# Patient Record
Sex: MALE | Race: ASIAN | Employment: PART TIME | URBAN - METROPOLITAN AREA
[De-identification: names, ages, dates, MRNs, and addresses within clinical notes are randomized per-mention and may not be internally consistent; named-entity substitution may affect disease eponyms.]

---

## 2019-01-13 ENCOUNTER — APPOINTMENT (OUTPATIENT)
Dept: GENERAL RADIOLOGY | Age: 19
End: 2019-01-13
Payer: MEDICARE

## 2019-01-13 ENCOUNTER — HOSPITAL ENCOUNTER (EMERGENCY)
Age: 19
Discharge: HOME | End: 2019-01-13
Payer: MEDICARE

## 2019-01-13 VITALS
SYSTOLIC BLOOD PRESSURE: 122 MMHG | OXYGEN SATURATION: 99 % | DIASTOLIC BLOOD PRESSURE: 79 MMHG | RESPIRATION RATE: 18 BRPM | BODY MASS INDEX: 23.55 KG/M2 | WEIGHT: 128.75 LBS | HEART RATE: 96 BPM | TEMPERATURE: 98.2 F

## 2019-01-13 DIAGNOSIS — S63.501A SPRAIN OF RIGHT WRIST, INITIAL ENCOUNTER: Primary | ICD-10-CM

## 2019-01-13 PROCEDURE — 99283 EMERGENCY DEPT VISIT LOW MDM: CPT

## 2019-01-13 PROCEDURE — 73130 X-RAY EXAM OF HAND: CPT

## 2019-01-13 PROCEDURE — 4500000023 HC ED LEVEL 3 PROCEDURE

## 2019-01-13 PROCEDURE — 73110 X-RAY EXAM OF WRIST: CPT

## 2019-01-13 RX ORDER — NAPROXEN 250 MG/1
500 TABLET ORAL ONCE
Status: DISCONTINUED | OUTPATIENT
Start: 2019-01-13 | End: 2019-01-13 | Stop reason: HOSPADM

## 2019-01-13 ASSESSMENT — ENCOUNTER SYMPTOMS
BACK PAIN: 0
NAUSEA: 0

## 2019-01-13 ASSESSMENT — PAIN SCALES - GENERAL
PAINLEVEL_OUTOF10: 10
PAINLEVEL_OUTOF10: 5

## 2019-02-19 ENCOUNTER — APPOINTMENT (OUTPATIENT)
Dept: GENERAL RADIOLOGY | Age: 19
End: 2019-02-19
Payer: MEDICARE

## 2019-02-19 ENCOUNTER — HOSPITAL ENCOUNTER (EMERGENCY)
Age: 19
Discharge: HOME | End: 2019-02-19
Payer: MEDICARE

## 2019-02-19 VITALS
WEIGHT: 131.84 LBS | TEMPERATURE: 97.6 F | RESPIRATION RATE: 12 BRPM | HEART RATE: 98 BPM | SYSTOLIC BLOOD PRESSURE: 124 MMHG | BODY MASS INDEX: 24.11 KG/M2 | DIASTOLIC BLOOD PRESSURE: 80 MMHG | OXYGEN SATURATION: 99 %

## 2019-02-19 DIAGNOSIS — S69.91XA INJURY OF RIGHT HAND, INITIAL ENCOUNTER: Primary | ICD-10-CM

## 2019-02-19 PROCEDURE — 99283 EMERGENCY DEPT VISIT LOW MDM: CPT

## 2019-02-19 PROCEDURE — 73130 X-RAY EXAM OF HAND: CPT

## 2019-02-19 ASSESSMENT — PAIN SCALES - GENERAL: PAINLEVEL_OUTOF10: 10

## 2019-02-20 ASSESSMENT — ENCOUNTER SYMPTOMS: COLOR CHANGE: 1

## 2020-01-03 ENCOUNTER — HOSPITAL ENCOUNTER (EMERGENCY)
Age: 20
Discharge: HOME | End: 2020-01-03
Payer: MEDICARE

## 2020-01-03 VITALS
SYSTOLIC BLOOD PRESSURE: 117 MMHG | OXYGEN SATURATION: 100 % | TEMPERATURE: 97.1 F | RESPIRATION RATE: 16 BRPM | HEART RATE: 77 BPM | DIASTOLIC BLOOD PRESSURE: 80 MMHG

## 2020-01-03 PROCEDURE — 99282 EMERGENCY DEPT VISIT SF MDM: CPT

## 2020-01-03 ASSESSMENT — ENCOUNTER SYMPTOMS: RESPIRATORY NEGATIVE: 1

## 2020-01-03 ASSESSMENT — PAIN SCALES - GENERAL
PAINLEVEL_OUTOF10: 0
PAINLEVEL_OUTOF10: 0

## 2020-01-03 NOTE — ED PROVIDER NOTES
[facility]  eMERGENCY dEPARTMENT eNCOUnter    Pt Name: @@  MRN: [id number]  Birthdate2000  Date of evaluation: 1/3/2020  Provider: IRVIN Ledesma CNP     Evaluated by the advanced practice provider    [address] [place] [address]       Chief Complaint   Patient presents with    Laceration     pt \"rolled\" ankle and fell onto right hand. + abraision. denies ankle pain. tetanus UTD. HISTORY OF PRESENT ILLNESS  (Location/Symptom, Timing/Onset, Context/Setting, Quality, Duration, Modifying Factors, Severity.)   Haroon Ambriz is a 22 y.o. male who presents to the emergency department complains of pain on the dorsal surface of the right hand between the third MCP and the fourth MCP and the webspace. States that this evening he tripped in his room, rolled his ankle, ankle was not the issue or concern, and noticed few abrasions on the hand and knuckles. He states that he is concerned that maybe he has a tendon injury around the right third finger. Nursing Notes were reviewed and I agree. REVIEW OF SYSTEMS    (2-9 systems for level 4, 10 or more for level 5)     Review of Systems   Constitutional: Negative. Respiratory: Negative. Cardiovascular: Negative. Musculoskeletal: Positive for arthralgias. Except as noted above the remainder of the review of systems was reviewed and negative. PAST MEDICAL HISTORY         Diagnosis Date    Asthma    Autism spectrum disorder  Rheumatoid arthritis    SURGICAL HISTORY           Procedure Laterality Date    TYMPANOSTOMY TUBE PLACEMENT         CURRENT MEDICATIONS       Current Discharge Medication List      CONTINUE these medications which have NOT CHANGED    Details   albuterol sulfate  (90 Base) MCG/ACT inhaler Inhale 2 puffs into the lungs every 4-6 hours as needed      Golimumab 50 MG/0.5ML SOAJ Inject 50 mg into the skin every 30 days             ALLERGIES     Abilify [aripiprazole];  Flovent hfa [fluticasone]; Invega [paliperidone]; Risperidone and related; Seroquel [quetiapine]; Wellbutrin [bupropion]; and Zyprexa [olanzapine]    FAMILY HISTORY     History reviewed. No pertinent family history. No family status information on file. SOCIAL HISTORY      reports that he has been smoking cigarettes. He has been smoking about 0.25 packs per day. He has never used smokeless tobacco. He reports that he does not drink alcohol or use drugs. PHYSICAL EXAM    (up to 7 for level 4, 8 or more for level 5)      ED Triage Vitals [01/03/20 1730]   BP Temp Temp Source Pulse Resp SpO2 Height Weight   117/80 97.1 °F (36.2 °C) Oral 77 16 100 % -- --       Physical Exam  Vitals signs and nursing note reviewed. HENT:      Head: Normocephalic. Cardiovascular:      Rate and Rhythm: Normal rate. Pulses: Normal pulses. Pulmonary:      Effort: Pulmonary effort is normal.   Musculoskeletal:      Right hand: He exhibits tenderness. He exhibits normal range of motion, no bony tenderness, normal two-point discrimination, normal capillary refill, no deformity, no laceration and no swelling. Normal sensation noted. Decreased sensation is not present in the ulnar distribution, is not present in the medial distribution and is not present in the radial distribution. Normal strength noted. He exhibits no finger abduction, no thumb/finger opposition and no wrist extension trouble. Hands:       Comments: Patient is able to independently and actively flex all 5 digits on the right hand at the MCP PIP joints. He is able to flex and extend actively against resistance at the MCP PIP and DIP joints. He has tenderness directly on the third MCP. There is a small amount of bruising. Palmar surface of the hand is unremarkable. Cap refill less than 2 seconds. No deformity. He has 3 small superficial abrasions on the third MCP fourth MCP and the third PIP joint. Skin:     General: Skin is warm and dry. Capillary Refill: Capillary refill takes less than 2 seconds. Neurological:      Mental Status: He is alert. DIAGNOSTIC RESULTS     RADIOLOGY:   Non-plain film images such as CT, Ultrasound and MRI are read by the radiologist. Plain radiographic images are visualized and preliminarily interpreted by IRVIN Montgomery CNP with the below findings:        Interpretation per the Radiologist below, if available at the time of this note:    No orders to display       LABS:  Labs Reviewed - No data to display    All other labs were within normal range or not returned as of this dictation. EMERGENCY DEPARTMENT COURSE and DIFFERENTIAL DIAGNOSIS/MDM:   Vitals:    Vitals:    01/03/20 1730   BP: 117/80   Pulse: 77   Resp: 16   Temp: 97.1 °F (36.2 °C)   TempSrc: Oral   SpO2: 100%       MDM    Patient was seen and evaluated per myself. Dr. Carlos Luque present available for consultation as needed. On clinical exam I only appreciate some mild tenderness and may be some early developing bruising over the third MCP and fourth MCP. Neurologically he is intact. There is no lacerations only superficial abrasions. He performed all musculoskeletal range of motion appropriately and as would expect. I do not feel that there is any bony injury and I do not feel that there is any soft tissue injury such as ligament or tendon. I see no indication that would warrant an x-ray. This was discussed with the patient as well as the patient's mother. Both verbalized understanding. They would appreciate an orthopedic referral because the patient has had a ruptured thumb tendon in the past and was able to perform appropriate range of motion. Based on this information with his history of rheumatoid arthritis I again offered to perform obtain an x-ray and both declined. I also offered to applied a static aluminum splint and both declined that as well.     Patient is discharged with an orthopedic referral and instructed on management of contusions and wound care. This dictation was performed with a verbal recognition program (DRAGON) and it was checked for errors. It is possible that there are still dictated errors within this office note. If so, please bring any errors to my attention for an addendum. All efforts were made to ensure that this office note is accurate. PROCEDURES:  Procedures    FINAL IMPRESSION      1. Contusion of finger without damage to nail, unspecified finger, initial encounter    2.  Contusion of right hand, initial encounter          DISPOSITION/PLAN   DISPOSITION Decision To Discharge 01/03/2020 06:16:26 PM      PATIENT REFERRED TO:  Akshat Barron MD  [place] [address]  [phone]    Schedule an appointment as soon as possible for a visit   As needed    Cristina Cardoza MD  [address] [place] [address]  [phone]    Schedule an appointment as soon as possible for a visit   Orthopedic referral for concerns of hand injury      DISCHARGE MEDICATIONS:  Current Discharge Medication List          (Please note that portions of this note werecompleted with a voice recognition program.  Efforts were made to edit the dictations but occasionally words are mis-transcribed.)    Cindy Sommer, APRTAMIA - CNP      IRVIN Alston - LYNETTE  01/03/20 2303

## 2020-01-08 ENCOUNTER — APPOINTMENT (OUTPATIENT)
Dept: GENERAL RADIOLOGY | Age: 20
End: 2020-01-08
Payer: MEDICARE

## 2020-01-08 ENCOUNTER — HOSPITAL ENCOUNTER (EMERGENCY)
Age: 20
Discharge: HOME | End: 2020-01-09
Payer: MEDICARE

## 2020-01-08 PROCEDURE — 73130 X-RAY EXAM OF HAND: CPT

## 2020-01-08 PROCEDURE — 99283 EMERGENCY DEPT VISIT LOW MDM: CPT

## 2020-01-08 ASSESSMENT — PAIN SCALES - GENERAL: PAINLEVEL_OUTOF10: 10

## 2020-01-08 ASSESSMENT — PAIN DESCRIPTION - PAIN TYPE: TYPE: ACUTE PAIN

## 2020-01-08 ASSESSMENT — PAIN DESCRIPTION - LOCATION: LOCATION: HAND

## 2020-01-08 ASSESSMENT — PAIN DESCRIPTION - DESCRIPTORS: DESCRIPTORS: DISCOMFORT

## 2020-01-09 VITALS
BODY MASS INDEX: 23.79 KG/M2 | SYSTOLIC BLOOD PRESSURE: 120 MMHG | WEIGHT: 130.07 LBS | RESPIRATION RATE: 16 BRPM | HEART RATE: 80 BPM | TEMPERATURE: 97.3 F | DIASTOLIC BLOOD PRESSURE: 80 MMHG | OXYGEN SATURATION: 100 %

## 2020-01-09 RX ORDER — AMOXICILLIN 500 MG/1
500 CAPSULE ORAL 2 TIMES DAILY
Qty: 20 CAPSULE | Refills: 0 | Status: SHIPPED | OUTPATIENT
Start: 2020-01-09 | End: 2020-01-19

## 2020-01-09 RX ORDER — ERGOCALCIFEROL 1.25 MG/1
50000 CAPSULE ORAL WEEKLY
COMMUNITY
End: 2021-01-05 | Stop reason: SDUPTHER

## 2020-01-09 ASSESSMENT — ENCOUNTER SYMPTOMS
FACIAL SWELLING: 0
SORE THROAT: 0
SINUS PAIN: 0
COLOR CHANGE: 0

## 2020-01-09 ASSESSMENT — PAIN SCALES - GENERAL: PAINLEVEL_OUTOF10: 10

## 2020-01-09 ASSESSMENT — PAIN - FUNCTIONAL ASSESSMENT: PAIN_FUNCTIONAL_ASSESSMENT: 0-10

## 2020-01-09 NOTE — ED PROVIDER NOTES
**EVALUATED BY ADVANCED PRACTICE PROVIDER**        [address] [facility]      Pt Name: Michael Sotomayor  LUF:[id number]  [place] 2000  Date of evaluation: 1/8/2020  Provider: IRVIN Clayton CNP      Chief Complaint:    Chief Complaint   Patient presents with    Hand Injury     bilateal hands. punched something today    Otalgia     right ear pain. Nursing Notes, Past Medical Hx, Past Surgical Hx, Social Hx, Allergies, and Family Hx were all reviewed and agreed with or any disagreements were addressed in the HPI.    HPI:  (Location, Duration, Timing, Severity, Quality, Assoc Sx, Context, Modifying factors)  This is a  22 y.o. male who presents to the emergency department today complaining of bilateral hand pain. Onset was yesterday. The context was he got angry and was punching a metal item. He has several abrasions with no lacerations. No numbness or tingling. He is also complaining of right ear pain. Patient had surgery on his right ear at [facility] 3 to 4 weeks ago. No fevers. No headache or dizziness. No sinus pressure. No neck pain or stiffness.     PastMedical/Surgical History:      Diagnosis Date    Arthritis     Asthma     Vitamin D deficiency          Procedure Laterality Date    ADENOIDECTOMY      CIRCUMCISION      TYMPANOSTOMY TUBE PLACEMENT         Medications:  Discharge Medication List as of 1/9/2020  1:59 AM      CONTINUE these medications which have NOT CHANGED    Details   vitamin D (ERGOCALCIFEROL) 1.25 MG (46585 UT) CAPS capsule Take 50,000 Units by mouth once a weekHistorical Med      albuterol sulfate  (90 Base) MCG/ACT inhaler Inhale 2 puffs into the lungs every 4-6 hours as neededHistorical Med      Golimumab 50 MG/0.5ML SOAJ Inject 50 mg into the skin every 30 daysHistorical Med               Review of Systems:  Review of Systems   Constitutional: Negative for chills, diaphoresis and fever. HENT: Positive for ear pain (right). Negative for congestion, facial swelling, sinus pain and sore throat. Genitourinary: Negative for dysuria. Musculoskeletal: Positive for arthralgias and joint swelling (hands). Negative for neck pain and neck stiffness. Skin: Positive for wound. Negative for color change. Allergic/Immunologic: Negative for immunocompromised state. Neurological: Negative for dizziness and headaches. Hematological: Negative for adenopathy. Psychiatric/Behavioral: Negative for confusion. All other systems reviewed and are negative. Positives and Pertinent negatives as per HPI. Except as noted above in the ROS, problem specific ROS was completed and is negative. Physical Exam:  Physical Exam  Vitals signs and nursing note reviewed. Constitutional:       General: He is not in acute distress. Appearance: Normal appearance. He is well-developed. He is not toxic-appearing. HENT:      Head: Normocephalic and atraumatic. Right Ear: Tenderness present. Tympanic membrane is erythematous. Left Ear: Tympanic membrane, ear canal and external ear normal.      Mouth/Throat:      Lips: Pink. Mouth: Mucous membranes are moist.      Pharynx: Oropharynx is clear. Eyes:      General: No scleral icterus. Conjunctiva/sclera: Conjunctivae normal.   Neck:      Musculoskeletal: Full passive range of motion without pain and neck supple. No neck rigidity. Vascular: No JVD. Cardiovascular:      Rate and Rhythm: Normal rate and regular rhythm. Heart sounds: No murmur. No friction rub. No gallop. Pulmonary:      Effort: Pulmonary effort is normal. No respiratory distress. Breath sounds: Normal breath sounds. Abdominal:      Palpations: Abdomen is not rigid. Musculoskeletal: Normal range of motion. Right hand: He exhibits tenderness and swelling.  He exhibits normal range of motion, no bony tenderness, normal capillary refill, no deformity and no laceration. Normal sensation noted. Normal strength noted. Left hand: He exhibits tenderness, decreased capillary refill and swelling. He exhibits normal range of motion, no bony tenderness, no deformity and no laceration. Normal sensation noted. Normal strength noted. Hands:    Skin:     General: Skin is warm and dry. Capillary Refill: Capillary refill takes less than 2 seconds. Findings: Abrasion and bruising present. Neurological:      General: No focal deficit present. Mental Status: He is alert and oriented to person, place, and time. Cranial Nerves: Cranial nerves are intact. Psychiatric:         Mood and Affect: Mood normal.         MEDICAL DECISION MAKING    Vitals:    Vitals:    01/08/20 2201 01/09/20 0206   BP: 120/82 120/80   Pulse: 87 80   Resp: 16 16   Temp: 97.3 °F (36.3 °C) 97.3 °F (36.3 °C)   TempSrc: Oral Oral   SpO2: 100% 100%   Weight: 130 lb 1.1 oz (59 kg)        LABS:Labs Reviewed - No data to display     Remainder of labs reviewed and werenegative at this time or not returned at the time of this note. RADIOLOGY:   Non-plain film images such as CT, Ultrasound and MRI are read by the radiologist. IRVIN Kruse CNP have directly visualized the radiologic plain film image(s) with the below findings:        Interpretation per the Radiologist below, if available at the time of this note:    XR HAND RIGHT (MIN 3 VIEWS)   Final Result   No acute osseous abnormality. XR HAND LEFT (MIN 3 VIEWS)   Final Result   No acute osseous abnormality. Xr Hand Left (min 3 Views)    Result Date: 1/8/2020  EXAMINATION: THREE XRAY VIEWS OF THE LEFT HAND 1/8/2020 10:16 pm COMPARISON: None. HISTORY: ORDERING SYSTEM PROVIDED HISTORY: pt reports it \"hurts everywhere. abrasion to left 2nd finger knuckle and 3rd knuckle. TECHNOLOGIST PROVIDED HISTORY: Reason for exam:->pt reports it \"hurts everywhere.  abrasion to left 2nd finger

## 2020-01-09 NOTE — ED NOTES
Discharge and education instructions reviewed. Patient verbalized understanding, teach-back successful. Patient denied questions at this time. No acute distress noted. Patient instructed to follow-up as noted - return to emergency department if symptoms worsen. Patient verbalized understanding. Discharged per EDMD with discharge instructions.          Kristopher Farfan RN  01/09/20 6380

## 2020-01-20 ENCOUNTER — OFFICE VISIT (OUTPATIENT)
Dept: FAMILY MEDICINE CLINIC | Age: 20
End: 2020-01-20
Payer: MEDICARE

## 2020-01-20 VITALS
BODY MASS INDEX: 24.48 KG/M2 | RESPIRATION RATE: 12 BRPM | HEIGHT: 62 IN | OXYGEN SATURATION: 98 % | SYSTOLIC BLOOD PRESSURE: 100 MMHG | DIASTOLIC BLOOD PRESSURE: 63 MMHG | HEART RATE: 82 BPM | WEIGHT: 133 LBS

## 2020-01-20 PROBLEM — Z90.49 HISTORY OF APPENDECTOMY: Status: ACTIVE | Noted: 2020-01-20

## 2020-01-20 PROBLEM — Z90.49 HISTORY OF APPENDECTOMY: Status: RESOLVED | Noted: 2020-01-20 | Resolved: 2020-01-20

## 2020-01-20 PROBLEM — Z98.890 HISTORY OF TYMPANOPLASTY: Status: ACTIVE | Noted: 2020-01-20

## 2020-01-20 PROBLEM — F99 MENTAL HEALTH DISORDER: Status: ACTIVE | Noted: 2020-01-20

## 2020-01-20 LAB — VITAMIN D 25-HYDROXY: 33.3 NG/ML

## 2020-01-20 PROCEDURE — 36415 COLL VENOUS BLD VENIPUNCTURE: CPT | Performed by: INTERNAL MEDICINE

## 2020-01-20 PROCEDURE — G8420 CALC BMI NORM PARAMETERS: HCPCS | Performed by: INTERNAL MEDICINE

## 2020-01-20 PROCEDURE — G8484 FLU IMMUNIZE NO ADMIN: HCPCS | Performed by: INTERNAL MEDICINE

## 2020-01-20 PROCEDURE — 99204 OFFICE O/P NEW MOD 45 MIN: CPT | Performed by: INTERNAL MEDICINE

## 2020-01-20 PROCEDURE — G8427 DOCREV CUR MEDS BY ELIG CLIN: HCPCS | Performed by: INTERNAL MEDICINE

## 2020-01-20 PROCEDURE — 4004F PT TOBACCO SCREEN RCVD TLK: CPT | Performed by: INTERNAL MEDICINE

## 2020-01-20 RX ORDER — ALBUTEROL SULFATE 90 UG/1
2 AEROSOL, METERED RESPIRATORY (INHALATION) 4 TIMES DAILY PRN
Qty: 1 INHALER | Refills: 1 | Status: SHIPPED | OUTPATIENT
Start: 2020-01-20 | End: 2020-03-24 | Stop reason: SDUPTHER

## 2020-01-20 NOTE — PROGRESS NOTES
HPI: Haroon Ambriz presents to establish care and get established with a psychologist.    Health issues include ankylosing spondylitis, Humira use, low vitamin D, renal calculi, tobacco addiction, asthma, mental health issues with multiple drug sensitivities, restrictive lung disease secondary to ankylosing spondylitis, occasional ectopy. States over the last couple days he has had some intermittent skipping of the heart. Echocardiogram 2018 with mildly thickened mitral valve. Good ejection fraction without scar. 48-hour Holter reportedly unremarkable. 1 soda a day. Positive tobacco.  Denies current stressors. No syncope. No recreational drugs. No alcohol currently. No syncope. No chest pain with exertion. Has had symptoms similar in the past which resolved without medication. Ankylosing spondylitis without extra-articular symptoms. On Humira. Aware that this is a immunosuppressive medication. Stiffness of the neck. Occasional falls. No iritis, inflammatory bowel symptoms, has had restrictive pulmonary function test.  Uses albuterol for apparent wheeze. Positive HLA-B27. Doing better with his Humira. History low vitamin D.  50,000 units monthly. Diagnosed with asthma. Has inhaler. Reaction to [place]. No symptoms currently. Has had pneumonia vaccine. Interested in patch for smoking cessation. Had hallucinations with Wellbutrin. Not interested in Chantix secondary to some mental health issues    3 renal calculi no surgical intervention needed. Mental health issues. Worn in [place]. I see he has intolerance to Abilify, risperidone, Seroquel, Wellbutrin, Zyprexa, and in [place]. He and mother are wanting to get into counseling. They are not interested in psychiatrist at this time. Is aware of the mental health crisis unit. Wishes him to have therapy for expressive and cognitive training.   While hospitalizations for suicide attempt and ideation, aggression, auditory and visual hallucinations, diagnosed with posttraumatic stress disorder, moderate intellectual disability, borderline personality disorder and ADHD. He has been associated with [facility] developmental disability services and [facility]    Specialist: Rheumatology,  Audiology ENT hearing aids   Interested in counselor. PMH:    Tympanoplasty     Circumcision     Adenoidectomy     Hospitalizations for suicidal ideation, attempts, aggressive behavior and hallucination    SH: lives with step sister, step dad and mom. + tobacco 4 to 5 cigarettes daily. . No alcohol. Not currently sexually active condoms. [employer]. FH: adopted     Microcephaly, mental illness       Of systems: Mental illness, concussion, hearing loss, restrictive lung disease, intermittent palpitations, no GE reflux bloody stools kidney infections. Positive stones, no skin concerns.   Occasional palpitations    Constitutional, ent, CV, respiratory, GI, , joint, skin, allergic and psychiatric ROS reviewed and negative except for above    Allergies   Allergen Reactions    Latex     Abilify [Aripiprazole]     Flovent Hfa [Fluticasone]     Invega [Paliperidone]     Risperidone And Related     Seroquel [Quetiapine]     Wellbutrin [Bupropion]     Zyprexa [Olanzapine]        Outpatient Medications Marked as Taking for the 1/20/20 encounter (Office Visit) with Kellie Davenport MD   Medication Sig Dispense Refill    albuterol sulfate  (90 Base) MCG/ACT inhaler Inhale 2 puffs into the lungs 4 times daily as needed for Wheezing 1 Inhaler 1    vitamin D (ERGOCALCIFEROL) 1.25 MG (21900 UT) CAPS capsule Take 50,000 Units by mouth once a week      Golimumab 50 MG/0.5ML SOAJ Inject 50 mg into the skin every 30 days               Past Medical History:   Diagnosis Date    Arthritis     Asthma     Mental health disorder 1/20/2020    Vitamin D deficiency        Past Surgical History:   Procedure Laterality Date    ADENOIDECTOMY      CIRCUMCISION      TYMPANOSTOMY TUBE PLACEMENT      colesteoma             No family history on file. Review of Systems        Objective     /63   Pulse 82   Resp 12   Ht 5' 2\" (1.575 m)   Wt 133 lb (60.3 kg)   SpO2 98% Comment: RA  BMI 24.33 kg/m²     @LASTSAO2(3)@    Wt Readings from Last 3 Encounters:   01/08/20 130 lb 1.1 oz (59 kg)   01/08/20 130 lb (59 kg)   02/19/19 131 lb 13.4 oz (59.8 kg)       Physical Exam     NAD alert and cooperative multiple tattoos. Good eye contact unusual affect  HEENT: Abnormal tympanic membranes. Decreased hearing right. Cranial nerves otherwise intact. Throat is clear. Good dentition. No adenopathy or thyromegaly. Lungs are clear. Good TIGRE ratio without any wheezes rales or rhonchi. Cardiovascular exam I do not any murmur click. No gallop or S4. Abdomen is benign without any hepatosplenomegaly. Crepitus of the knees. Good pulses lower extremities. Multiple tattoos. Walks well. Chemistry        Component Value Date/Time     08/11/2011 0815    K 4.5 08/11/2011 0815     08/11/2011 0815    CO2 32 (H) 08/11/2011 0815    BUN 17 08/11/2011 0815    CREATININE 0.8 08/11/2011 0815        Component Value Date/Time    CALCIUM 10.5 08/11/2011 0815    ALKPHOS 175 08/11/2011 0815    AST 24 08/11/2011 0815    ALT 27 08/11/2011 0815    BILITOT 0.50 08/11/2011 0815            Lab Results   Component Value Date    WBC 6.2 08/11/2011    HGB 14.7 08/11/2011    HCT 42.7 08/11/2011    MCV 88.8 08/11/2011     08/11/2011     No results found for: LABA1C  No results found for: EAG  No results found for: LABA1C  No components found for: CHLPL  No results found for: TRIG  No results found for: HDL  No results found for: LDLCALC  No results found for: LABVLDL    Old labs and records reviewed or requested  Discussed past lab and studies with patient      Diagnosis Orders   1.  Ankylosing spondylitis, unspecified site of spine ([facility] [address] Vitamin D 25 Hydroxy   2. Hypovitaminosis D  Vitamin D 25 Hydroxy   3. Tobacco use     4. History of tympanoplasty     5. History of appendectomy         Loosening spondylitis on Humira immunosuppressed and low vitamin D focally with rheumatology. Will check his vitamin D today. Tobacco abuse. Discussed cessation. Intolerant of Wellbutrin. Will try for 7 mg patch. Hearing loss with tympanoplasty. Following with ENT. Intermittent palpitations with mitral valve thickening. No syncope. Reported normal Holter monitor. Will cut back on his caffeine. Mental health disorder    Follow-up 3 weeks on nicotine patch      Diagnosis and treatment discussed.   Possible side effects of medication reviewed  Patients questions answered  Follow up understood  Pt aware if they are not contacted about any test results , this does not mean they are normal.  They should call

## 2020-01-20 NOTE — PATIENT INSTRUCTIONS
sexually transmitted infections (STIs). Ask whether you should have tests for STIs. You may be at risk if you have sex with more than one person, especially if your partners do not wear condoms. For men  · Tests for sexually transmitted infections (STIs). Ask whether you should have tests for STIs. You may be at risk if you have sex with more than one person, especially if you do not wear a condom. · Testicular cancer exam. Ask your doctor whether you should check your testicles regularly. · Prostate exam. Talk to your doctor about whether you should have a blood test (called a PSA test) for prostate cancer. Experts differ on whether and when men should have this test. Some experts suggest it if you are older than 39 and are -American or have a father or brother who got prostate cancer when he was younger than 72. When should you call for help? Watch closely for changes in your health, and be sure to contact your doctor if you have any problems or symptoms that concern you. Where can you learn more? Go to https://[employer] TelegraphpeModacruz.healthVersafe. org and sign in to your [employer] account. Enter P072 in the [employer] box to learn more about \"Well Visit, Ages 25 to 48: Care Instructions. \"     If you do not have an account, please click on the \"Sign Up Now\" link. Current as of: August 21, 2019  Content Version: 12.3  © 6666-4398 Healthwise, Incorporated. Care instructions adapted under license by [facility] ([facility]). If you have questions about a medical condition or this instruction, always ask your healthcare professional. [place] [address] any warranty or liability for your use of this information.

## 2020-02-18 ENCOUNTER — PATIENT MESSAGE (OUTPATIENT)
Dept: FAMILY MEDICINE CLINIC | Age: 20
End: 2020-02-18

## 2020-02-26 ENCOUNTER — OFFICE VISIT (OUTPATIENT)
Dept: PSYCHOLOGY | Age: 20
End: 2020-02-26
Payer: MEDICARE

## 2020-02-26 PROCEDURE — 90791 PSYCH DIAGNOSTIC EVALUATION: CPT | Performed by: PSYCHOLOGIST

## 2020-02-26 ASSESSMENT — PATIENT HEALTH QUESTIONNAIRE - PHQ9
2. FEELING DOWN, DEPRESSED OR HOPELESS: 0
1. LITTLE INTEREST OR PLEASURE IN DOING THINGS: 0
SUM OF ALL RESPONSES TO PHQ QUESTIONS 1-9: 0
SUM OF ALL RESPONSES TO PHQ9 QUESTIONS 1 & 2: 0
SUM OF ALL RESPONSES TO PHQ QUESTIONS 1-9: 0

## 2020-02-26 ASSESSMENT — ANXIETY QUESTIONNAIRES
6. BECOMING EASILY ANNOYED OR IRRITABLE: 3-NEARLY EVERY DAY
4. TROUBLE RELAXING: 1-SEVERAL DAYS
2. NOT BEING ABLE TO STOP OR CONTROL WORRYING: 1-SEVERAL DAYS
1. FEELING NERVOUS, ANXIOUS, OR ON EDGE: 1-SEVERAL DAYS
3. WORRYING TOO MUCH ABOUT DIFFERENT THINGS: 1-SEVERAL DAYS
5. BEING SO RESTLESS THAT IT IS HARD TO SIT STILL: 1-SEVERAL DAYS
GAD7 TOTAL SCORE: 11
7. FEELING AFRAID AS IF SOMETHING AWFUL MIGHT HAPPEN: 3-NEARLY EVERY DAY

## 2020-02-26 NOTE — PROGRESS NOTES
Behavioral Health Consultation  Vale Sultana PsyD  Psychologist  2/26/2020  11:37 AM      Time spent with Patient: 30 minutes  This is patient's first  [facility] appointment. Reason for Consult:  PTSD  Referring Provider: Miguel Escobedo MD  [address] [place] [address]    Pt provided informed consent for the behavioral health program. Discussed with patient model of service to include the limits of confidentiality (i.e. abuse reporting, suicide intervention, etc.) and short-term intervention focused approach. Pt indicated understanding. Feedback given to PCP. S:    Presenting Problem (PP): anxiety    Problem hx: Per Dr Noe Swanson on 1/20: \"Mental health issues. Worn in [place]. I see he has intolerance to Abilify, risperidone, Seroquel, Wellbutrin, Zyprexa, and in [place] Jar[place]. He and mother are wanting to get into counseling. They are not interested in psychiatrist at this time. Is aware of the mental health crisis unit. Wishes him to have therapy for expressive and cognitive training. While hospitalizations for suicide attempt and ideation, aggression, auditory and visual hallucinations, diagnosed with posttraumatic stress disorder, moderate intellectual disability, borderline personality disorder and ADHD. He has been associated with [facility] developmental disability services and [facility] behavioral health\"    UPDATE: Met with pt (and mother). No GCB after 4 years CM and counseling. Didn't feel like they were listening.  CC: sleep trouble, falling and staying asleep, feeling tired daily     Current psych med tx: none    Psych ROS:      Depression: see PHQ-9 score below     Sultana: DENIES insomnia with increased energy, rapid speech, easily distracted or decreased attention, irritability, racing thoughts, expansive mood, increase in energy and goal directed behavior, grandiosity, flight of ideas     Anxiety:  see LANCE-7 score below    OCD:  Denies     Panic:  Denies Psychosis: denies A/VH, or delusions    Substance abuse: none     PTSD:  see above    O:  MSE:    Appearance    alert, cooperative  Appetite normal  Sleep disturbance No  Fatigue No  Loss of pleasure No  Impulsive behavior No  Speech    normal rate, normal volume and well articulated  Mood    Stable, managing  Affect    Guarded with full range  Thought Content    intact  Thought Process    linear, goal directed and coherent  Associations    logical connections  Insight    Good  Judgment    Intact  Orientation    oriented to person, place, time, and general circumstances  Memory    recent and remote memory intact  Attention/Concentration    intact  Morbid ideation No  Suicide Assessment    no suicidal ideation    History:    Medications:   Current Outpatient Medications   Medication Sig Dispense Refill    nicotine (NICODERM CQ) 7 MG/24HR Place 1 patch onto the skin every 24 hours 30 patch 0    albuterol sulfate  (90 Base) MCG/ACT inhaler Inhale 2 puffs into the lungs 4 times daily as needed for Wheezing 1 Inhaler 1    vitamin D (ERGOCALCIFEROL) 1.25 MG (06243 UT) CAPS capsule Take 50,000 Units by mouth once a week      Golimumab 50 MG/0.5ML SOAJ Inject 50 mg into the skin every 30 days       No current facility-administered medications for this visit.         Social History:   Social History     Socioeconomic History    Marital status: Single     Spouse name: Not on file    Number of children: Not on file    Years of education: Not on file    Highest education level: Not on file   Occupational History    Not on file   Social Needs    Financial resource strain: Not on file    Food insecurity:     Worry: Not on file     Inability: Not on file    Transportation needs:     Medical: Not on file     Non-medical: Not on file   Tobacco Use    Smoking status: Current Every Day Smoker     Packs/day: 0.25     Types: Cigarettes    Smokeless tobacco: Never Used   Substance and Sexual Activity    Alcohol anxiety, 10-14 = moderate anxiety, 15+ = severe anxiety. Recommend referral to behavioral health for scores 10 or greater. Denied any si/hi risk, intent, or plan. Diagnosis:    PTSD      Diagnosis Date    Arthritis     Asthma     Mental health disorder 1/20/2020    Vitamin D deficiency      Problems with primary support group, Problems related to the social environment and Other psychosocial and environmental problems    Plan:  Pt interventions:    Trained in strategies for increasing balanced thinking, Discussed self-care (sleep, nutrition, rewarding activities, social support, exercise), Discussed benefits of referral for specialty care, Provided education on PTSD symptoms and treatment options for evidence-based treatment (Cognitive Processing Therapy and Prolonged Exposure), Motivational Interviewing to determine importance and readiness for change, Discussed potential barriers to change, Established rapport, Conducted functional assessment and Supportive techniques    Pt Behavioral Change Plan:    1. Call [facility] neuroscience for neuropsychological testing:    Dr Eloina Batres    2. E-mail list of therapist for Dr Maria Guadalupe Pavon for her to review: after I reviewed list recommended group practice [facility]  3.  Return to clinic for Dr Maria Guadalupe Pavon as needed

## 2020-02-26 NOTE — Clinical Note
KAILEEI--pt coming from [facility], going to need long term therapist. Recommended [facility]. I also believe we are dealing with a personality issue here as well which is absolutely going to require the psychotherapy.

## 2020-03-06 PROBLEM — R25.1 TREMOR: Status: ACTIVE | Noted: 2020-03-06

## 2020-03-24 ENCOUNTER — PATIENT MESSAGE (OUTPATIENT)
Dept: FAMILY MEDICINE CLINIC | Age: 20
End: 2020-03-24

## 2020-03-24 ENCOUNTER — TELEPHONE (OUTPATIENT)
Dept: PSYCHOLOGY | Age: 20
End: 2020-03-24

## 2020-03-24 RX ORDER — ALBUTEROL SULFATE 90 UG/1
2 AEROSOL, METERED RESPIRATORY (INHALATION) 4 TIMES DAILY PRN
Qty: 1 INHALER | Refills: 1 | Status: SHIPPED | OUTPATIENT
Start: 2020-03-24 | End: 2020-03-25 | Stop reason: SDUPTHER

## 2020-03-24 NOTE — TELEPHONE ENCOUNTER
JAY--this is update from mother via my chart: \"Hi Crystal, but [facility] doesnt take one of his insurances and we need both of those to pay for it. So, nicely, we wont need to have the appointments on the second and the ninth because Jaswinder Soares has found a counselor by himself, it is the [facility] and it is [place], Michelle Gardner will be his therapist       and he does his first meeting with her at noon on 31 March. It will be on video until society is back to face to face. I dont think they need a referral. Anyway Mika phone number is [phone] and its the [email or web address] if you would like to put it in the records. Thanks so much for getting him started. \"    Has new therapist!

## 2020-03-25 RX ORDER — ALBUTEROL SULFATE 90 UG/1
2 AEROSOL, METERED RESPIRATORY (INHALATION) 4 TIMES DAILY PRN
Qty: 1 INHALER | Refills: 3 | Status: SHIPPED | OUTPATIENT
Start: 2020-03-25 | End: 2020-07-27

## 2020-07-28 ENCOUNTER — VIRTUAL VISIT (OUTPATIENT)
Dept: FAMILY MEDICINE CLINIC | Age: 20
End: 2020-07-28
Payer: MEDICARE

## 2020-07-28 ENCOUNTER — TELEPHONE (OUTPATIENT)
Dept: FAMILY MEDICINE CLINIC | Age: 20
End: 2020-07-28

## 2020-07-28 PROCEDURE — 99213 OFFICE O/P EST LOW 20 MIN: CPT | Performed by: INTERNAL MEDICINE

## 2020-07-28 RX ORDER — NICOTINE 21 MG/24HR
PATCH, TRANSDERMAL 24 HOURS TRANSDERMAL
Qty: 30 PATCH | Refills: 0 | Status: SHIPPED | OUTPATIENT
Start: 2020-07-28 | End: 2020-08-05

## 2020-07-28 NOTE — PROGRESS NOTES
The below patient isbeing evaluated by a Virtual Visit (video visit) encounter to address concerns as mentioned above. A caregiver was present when appropriate. Due to this being a TeleHealth encounter (During IYG-12 public health emergency), evaluation of the following organ systems was limited: Vitals/Constitutional/EENT/Resp/CV/GI//MS/Neuro/Skin/Heme-Lymph-Imm. Pursuant to the emergency declaration under the [address] [place] [address], [address] [place] [address] authority and the [employer] Act, this Virtual Visit was conducted with patient's (and/or legal guardian's) consent, to reduce the patient's risk of exposure to COVID-19 and provide necessary medical care. The patient (and/or legal guardian) has also been advised to contact this office for worsening conditions or problems, and seek emergency medical treatment and/or call 911 if deemed necessary. Patient identification was verified at the start of the visit: Yes    Total time spent on this encounter: Not billed by time    Services were provided through a video synchronous discussion virtually to substitute for in-person clinic visit. Patient and provider were located at their individual homes. --Angeles Romeo MD on 7/28/2020 at 5:22 PM    An electronic signature was used to authenticate this note. HPI: Giovanni Vivas presents for evaluation and management of hand trauma    Health issues include ankylosing spondylitis, Humira use, low vitamin D, renal calculi, tobacco addiction, asthma, mental health issues with multiple drug sensitivities, restrictive lung disease secondary to ankylosing spondylitis, occasional ectopy. History anger issues. States he is doing better. Punched the wall. Onset of pain. Went to urgent clinic. States film was negative. Continues have pain. Feels like it is doing worse. Notes some popping. Unable to make a clenched fist.  Is concerned. Using Advil and causes diarrhea. States ice, Ace, nonsteroidals it made it worse    Remote ectopy. Echocardiogram 2018 with mildly thickened mitral valve. Good ejection fraction without scar. 48-hour Holter reportedly unremarkable. Positive tobacco.. Has had symptoms similar in the past which resolved without medication.     Ankylosing spondylitis without extra-articular symptoms. golimumab  . No iritis, inflammatory bowel symptoms, has had restrictive pulmonary function test.  Uses albuterol for apparent wheeze. Positive HLA-B27. Doing better and wishes to continue.     History low vitamin D.  50,000 units monthly.     Diagnosed with asthma. Has inhaler. Reaction to Azmacort. + pneumonia vaccine. Interested in patch for smoking cessation. Had hallucinations with Wellbutrin. Not interested in Chantix secondary to some mental health issues is interested in trying a patch again     3 renal calculi no surgical intervention needed.     Mental health issues. Born in [place]. I see he has intolerance to Abilify, risperidone, Seroquel, Wellbutrin, Zyprexa, and in [place]. States no longer seeing a counselor. Has a friend he talks to. States he is not getting into as much trouble currently. Taking no medications. aware of the mental health crisis unit. hospitalizations for suicide attempt and ideation, aggression, auditory and visual hallucinations, diagnosed with posttraumatic stress disorder, moderate intellectual disability, borderline personality disorder and ADHD. He has been associated with [facility] developmental disability services and [facility] health     Specialist: Rheumatology,  Audiology ENT hearing aids        PMH:    Tympanoplasty     Circumcision     Adenoidectomy     Hospitalizations for suicidal ideation, attempts, aggressive behavior and hallucination     SH: lives with step sister, step dad and mom. + tobacco 4 to 5 cigarettes daily. . No alcohol.  Not currently sexually active condoms. Akash.      FH: adopted     Microcephaly, mental illness       Of systems: Mental illness, concussion, hearing loss, restrictive lung disease, intermittent palpitations, no GE reflux bloody stools kidney infections. Positive stones, no skin concerns. Occasional palpitations     Constitutional, ent, CV, respiratory, GI, , joint, skin, allergic and psychiatric ROS reviewed and negative except for above    Allergies   Allergen Reactions    Latex     Abilify [Aripiprazole]     Flovent Hfa [Fluticasone]     Invega [Paliperidone]     Risperidone And Related     Seroquel [Quetiapine]     Wellbutrin [Bupropion]     Zyprexa [Olanzapine]        Outpatient Medications Marked as Taking for the 7/28/20 encounter (Virtual Visit) with Daxa Allen MD   Medication Sig Dispense Refill    PROAIR  (90 Base) MCG/ACT inhaler INHALE TWO PUFFS BY MOUTH FOUR TIMES A DAY AS NEEDED FOR WHEEZING 8.5 g 2    Golimumab 50 MG/0.5ML SOAJ Inject 50 mg into the skin every 30 days               Past Medical History:   Diagnosis Date    Arthritis     Asthma     Mental health disorder 1/20/2020    Vitamin D deficiency        Past Surgical History:   Procedure Laterality Date    ADENOIDECTOMY      CIRCUMCISION      TYMPANOSTOMY TUBE PLACEMENT      colesteoma             No family history on file. Review of Systems      Chemistry        Component Value Date/Time     08/11/2011 0815    K 4.5 08/11/2011 0815     08/11/2011 0815    CO2 32 (H) 08/11/2011 0815    BUN 17 08/11/2011 0815    CREATININE 0.8 08/11/2011 0815        Component Value Date/Time    CALCIUM 10.5 08/11/2011 0815    ALKPHOS 175 08/11/2011 0815    AST 24 08/11/2011 0815    ALT 27 08/11/2011 0815    BILITOT 0.50 08/11/2011 0815            NO vitals  as this was a virtual visit during cvod19 pandemic  Mild dysarthria. Moving hand without difficulty. Making eggs.   Shows pain of the metacarpal phalangeal joint is tender. Unable to make a closed fist with the second finger. No erythema. No abrasion. Lab Results   Component Value Date    WBC 6.2 08/11/2011    HGB 14.7 08/11/2011    HCT 42.7 08/11/2011    MCV 88.8 08/11/2011     08/11/2011     No results found for: LABA1C  No results found for: EAG  No results found for: LABA1C  No components found for: CHLPL  No results found for: TRIG  No results found for: HDL  No results found for: LDLCALC  No results found for: LABVLDL    Old labs and records reviewed or requested  Discussed past lab and studies with patient      Diagnosis Orders   1. Injury of right hand, subsequent encounter  Hiwot Kimball MD, Hand Surgery (Hand, Wrist, Upper Extremity), [facility]   2. Tobacco use     3. Ankylosing spondylitis, unspecified site of spine ([facility] [address]       Recommended ice topical.  Wishes to follow-up with hand doctor. Referral given. History of reactive airways disease albuterol still at home. Interested in patch for smoking cessation 14 mg given. Mental health disorder. Feels like he is doing well now. States taking no medication        No follow-ups on file. Diagnosis and treatment discussed.   Possible side effects of medication reviewed  Patients questions answered  Follow up understood  Pt aware if they are not contacted about any test results , this does not mean they are normal.  They should call
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
## 2020-07-28 NOTE — TELEPHONE ENCOUNTER
Pt seen at [facility] urgent care ([place]). He had hand xray done there, still having pain. Did call the facility but they need medical release.     [facility]:    Phone [phone]  Fax number is [phone]

## 2020-08-05 ENCOUNTER — OFFICE VISIT (OUTPATIENT)
Dept: ORTHOPEDIC SURGERY | Age: 20
End: 2020-08-05
Payer: MEDICARE

## 2020-08-05 VITALS — HEIGHT: 62 IN | BODY MASS INDEX: 24.48 KG/M2 | WEIGHT: 133 LBS | TEMPERATURE: 98.2 F | RESPIRATION RATE: 16 BRPM

## 2020-08-05 PROBLEM — M79.644 PAIN OF FINGER OF RIGHT HAND: Status: ACTIVE | Noted: 2020-08-05

## 2020-08-05 PROCEDURE — G8427 DOCREV CUR MEDS BY ELIG CLIN: HCPCS | Performed by: PHYSICIAN ASSISTANT

## 2020-08-05 PROCEDURE — 4004F PT TOBACCO SCREEN RCVD TLK: CPT | Performed by: PHYSICIAN ASSISTANT

## 2020-08-05 PROCEDURE — 99203 OFFICE O/P NEW LOW 30 MIN: CPT | Performed by: PHYSICIAN ASSISTANT

## 2020-08-05 PROCEDURE — G8420 CALC BMI NORM PARAMETERS: HCPCS | Performed by: PHYSICIAN ASSISTANT

## 2020-08-05 RX ORDER — NAPROXEN 500 MG/1
1 TABLET ORAL
COMMUNITY

## 2020-08-05 RX ORDER — LORATADINE 10 MG/1
1 TABLET ORAL
COMMUNITY
Start: 2018-09-10

## 2020-08-05 NOTE — PATIENT INSTRUCTIONS
Hand Range of Motion Instructions      Dr. Mary Frausto    1. Be cautions in resuming fulll activities and use of the hand for next 2 - 4 weeks. 2. Perform the following exercises VIGOROUSLY at least four times a day. Exercises should be performed in the seated position with elbow on tabletop or other firm surface. If you cannot make these motions on your own, you may use other hand to assist in making these motions. A. Fully straighten fingers until hand is flat. Fully bend fingers until hand is in a full fist.   B. Bend wrist forward and backward (grasp hand around knuckles with other hand to do so). C. Rotate forearm so that your palm faces towards your face. Rotate forearm so that your palm faces away from your face (grasp hand around wrist with other hand to do so). D. Fully straighten elbow. Fully bend elbow. 3. Continue light use of the hand progressing to more normal us as it feels comfortable to do so. 4. In 2 - 4 weeks you may discontinue using the brace (if you were using one) and resume normal use of the hand and wrist if you have regained full and painless motion and function. 5. If you are unable to achieve  full and painless motion and function over 4 weeks, please call the office at [phone] to schedule a follow-up appointment with Dr. Jessica Carroll. Thank you for choosing [facility] for your Hand and Upper Extremity needs. If we can be of any further assistance to you, please do not hesitate to contact us.     Office Phone Number:  [phone]  or  [phone]
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
## 2020-09-28 RX ORDER — ALBUTEROL SULFATE 90 UG/1
AEROSOL, METERED RESPIRATORY (INHALATION)
Qty: 8.5 G | Refills: 1 | Status: SHIPPED | OUTPATIENT
Start: 2020-09-28 | End: 2020-10-23 | Stop reason: SDUPTHER

## 2020-10-26 RX ORDER — ALBUTEROL SULFATE 90 UG/1
AEROSOL, METERED RESPIRATORY (INHALATION)
Qty: 8.5 G | Refills: 1 | Status: SHIPPED | OUTPATIENT
Start: 2020-10-26 | End: 2021-01-05 | Stop reason: SDUPTHER

## 2020-11-09 PROBLEM — M26.629 TMJ ARTHRALGIA: Status: ACTIVE | Noted: 2020-11-09

## 2020-11-17 ENCOUNTER — PATIENT MESSAGE (OUTPATIENT)
Dept: FAMILY MEDICINE CLINIC | Age: 20
End: 2020-11-17

## 2020-11-17 RX ORDER — BUDESONIDE AND FORMOTEROL FUMARATE DIHYDRATE 160; 4.5 UG/1; UG/1
2 AEROSOL RESPIRATORY (INHALATION) 2 TIMES DAILY
Qty: 3 INHALER | Refills: 1 | Status: SHIPPED | OUTPATIENT
Start: 2020-11-17 | End: 2021-05-17

## 2020-11-17 NOTE — TELEPHONE ENCOUNTER
From: Lennox Hamburger  To: Lisset Robledo MD  Sent: 11/17/2020 10:07 AM EST  Subject: Prescription Question    Hi, I have used my inhalers after I went to the ER for asthma and start of pneumonia. Jarad had two more asthma attacks. So my asthma is not under control. I finished up mowing and mulching somebodys leaves and then it really kicked up. I had another round of prednisone. I have had two rounds of the prednisone. It helps open my lungs. For now I am out of it, and I do need some thing that has something in it like prednisone because its what keeps my lungs open. Your earliest appointment is seventh of December, and I need a solution, could you give my mom a call at [phone] and see if I can get in with the nurse practitioner to figure out what to do or can you prescribe me a new medication? The albuterol is not covering it.

## 2020-12-21 NOTE — PROGRESS NOTES
The below patient isbeing evaluated by a Virtual Visit (video visit) encounter to address concerns as mentioned above. A caregiver was present when appropriate. Due to this being a TeleHealth encounter (During EGVHP-00 public health emergency), evaluation of the following organ systems was limited: Vitals/Constitutional/EENT/Resp/CV/GI//MS/Neuro/Skin/Heme-Lymph-Imm. Pursuant to the emergency declaration under the [address] [place] [address], [address] [place] [address] authority and the [employer] Act, this Virtual Visit was conducted with patient's (and/or legal guardian's) consent, to reduce the patient's risk of exposure to COVID-19 and provide necessary medical care. The patient (and/or legal guardian) has also been advised to contact this office for worsening conditions or problems, and seek emergency medical treatment and/or call 911 if deemed necessary. Patient identification was verified at the start of the visit: Yes    Total time spent on this encounter: Not billed by time    Services were provided through a video synchronous discussion virtually to substitute for in-person clinic visit. Patient and provider were located at their individual homes. --Agnes Stoll MD on 12/21/2020 at 2:00 PM    An electronic signature was used to authenticate this note. HPI: Akosua Ocasio presents for follow-up of medical complaints. He is in bed in mother is involved in this discussion    Health issues include ankylosing spondylitis, transient Humira use,, low vitamin D, renal calculi, tobacco addiction, asthma, mental health issues with multiple drug sensitivities, restrictive lung disease secondary to ankylosing spondylitis, occasional ectopy, no congenital deafness. Hospitalizations for suicidal ideation, attempts, aggressive behavior and hallucination     SH: lives with step sister, step dad and mom. + tobacco 4 to 5 cigarettes daily. . No alcohol. Not currently sexually active condoms. Not holding jobs more than 6 to 8 weeks. Is staying awake during the night. Talks to friends on the phone. This helps him mentally with mood. Mother wanting to be his guardian and will be sending records     FH: adopted     Microcephaly, mental illness       Of systems: Mental illness, concussion, hearing loss, restrictive lung disease, intermittent palpitations, no GE reflux bloody stools kidney infections. Positive stones, no skin concerns.   Occasional palpitations    Constitutional, ent, CV, respiratory, GI, , joint, skin, allergic and psychiatric ROS reviewed and negative except for above    Allergies   Allergen Reactions    Latex Itching    Abilify [Aripiprazole] Other (See Comments)     Increased agitation    Divalproex Sodium Other (See Comments)     Double vision    Flovent Hfa [Fluticasone] Other (See Comments)     Throat burn      Invega [Paliperidone] Other (See Comments)     Nightmares      Risperidone And Related Other (See Comments)     Increased agitation    Seroquel [Quetiapine] Other (See Comments)     Makes him see things    Wellbutrin [Bupropion] Other (See Comments)     hallucinations    Zyprexa [Olanzapine] Other (See Comments)     Increased agitation       Outpatient Medications Marked as Taking for the 1/5/21 encounter (Virtual Visit) with Jennifer García MD   Medication Sig Dispense Refill    albuterol sulfate  (90 Base) MCG/ACT inhaler INHALE TWO PUFFS BY MOUTH FOUR TIMES A DAY AS NEEDED FOR WHEEZING 8.5 g 1    vitamin D (ERGOCALCIFEROL) 1.25 MG (15225 UT) CAPS capsule 1 po every other week 6 capsule 0    budesonide-formoterol (SYMBICORT) 160-4.5 MCG/ACT AERO Inhale 2 puffs into the lungs 2 times daily 3 Inhaler 1 Past Medical History:   Diagnosis Date    Arthritis     Asthma     Mental health disorder 1/20/2020    Vitamin D deficiency        Past Surgical History:   Procedure Laterality Date    ADENOIDECTOMY      CIRCUMCISION      TYMPANOSTOMY TUBE PLACEMENT      colesteoma             No family history on file. Review of Systems      Chemistry        Component Value Date/Time     08/11/2011 0815    K 4.5 08/11/2011 0815     08/11/2011 0815    CO2 32 (H) 08/11/2011 0815    BUN 17 08/11/2011 0815    CREATININE 0.8 08/11/2011 0815        Component Value Date/Time    CALCIUM 10.5 08/11/2011 0815    ALKPHOS 175 08/11/2011 0815    AST 24 08/11/2011 0815    ALT 27 08/11/2011 0815    BILITOT 0.50 08/11/2011 0815            NO vitals  as this was a virtual visit during [id number] pandemic    Lab Results   Component Value Date    WBC 6.2 08/11/2011    HGB 14.7 08/11/2011    HCT 42.7 08/11/2011    MCV 88.8 08/11/2011     08/11/2011     No results found for: LABA1C  No results found for: EAG  No results found for: LABA1C  No components found for: CHLPL  No results found for: TRIG  No results found for: HDL  No results found for: LDLCALC  No results found for: LABVLDL    Old labs and records reviewed or requested  Discussed past lab and studies with patient      Diagnosis Orders   1. Ankylosing spondylitis, unspecified site of spine ([facility] [address]     2. Bronchospasm     3. History of tympanoplasty     4. Arthralgia of temporomandibular joint, unspecified laterality     5. Tobacco use         Glistening spondylitis being followed by rheumatology no longer on medicine and doing well. History of bronchospasm doing well with his maintenance medicine in the spring and fall. Not needing currently. Will have flu vaccine in the near future. History of tympanoplasty decreased hearing TMJ pain is now wearing his hearing aid following with ENT. Tobacco use. Has cut back. Abnormal sleep cycle. Seems to be tolerating this without difficulty    No follow-ups on file. Diagnosis and treatment discussed.   Possible side effects of medication reviewed  Patients questions answered  Follow up understood  Pt aware if they are not contacted about any test results , this does not mean they are normal.  They should call

## 2021-01-05 ENCOUNTER — VIRTUAL VISIT (OUTPATIENT)
Dept: FAMILY MEDICINE CLINIC | Age: 21
End: 2021-01-05
Payer: MEDICAID

## 2021-01-05 DIAGNOSIS — Z98.890 HISTORY OF TYMPANOPLASTY: ICD-10-CM

## 2021-01-05 DIAGNOSIS — J98.01 BRONCHOSPASM: ICD-10-CM

## 2021-01-05 DIAGNOSIS — M45.9 ANKYLOSING SPONDYLITIS, UNSPECIFIED SITE OF SPINE (HCC): Primary | ICD-10-CM

## 2021-01-05 DIAGNOSIS — M26.629 ARTHRALGIA OF TEMPOROMANDIBULAR JOINT, UNSPECIFIED LATERALITY: ICD-10-CM

## 2021-01-05 DIAGNOSIS — Z72.0 TOBACCO USE: ICD-10-CM

## 2021-01-05 PROCEDURE — G8428 CUR MEDS NOT DOCUMENT: HCPCS | Performed by: INTERNAL MEDICINE

## 2021-01-05 PROCEDURE — 99213 OFFICE O/P EST LOW 20 MIN: CPT | Performed by: INTERNAL MEDICINE

## 2021-01-05 RX ORDER — ERGOCALCIFEROL 1.25 MG/1
CAPSULE ORAL
Qty: 6 CAPSULE | Refills: 0 | Status: SHIPPED | OUTPATIENT
Start: 2021-01-05 | End: 2021-04-05

## 2021-01-05 RX ORDER — ALBUTEROL SULFATE 90 UG/1
AEROSOL, METERED RESPIRATORY (INHALATION)
Qty: 8.5 G | Refills: 1 | Status: SHIPPED | OUTPATIENT
Start: 2021-01-05 | End: 2021-02-18

## 2021-02-18 RX ORDER — ALBUTEROL SULFATE 90 UG/1
AEROSOL, METERED RESPIRATORY (INHALATION)
Qty: 8.5 G | Refills: 0 | Status: SHIPPED | OUTPATIENT
Start: 2021-02-18 | End: 2021-03-08 | Stop reason: SDUPTHER

## 2021-03-08 RX ORDER — ALBUTEROL SULFATE 90 UG/1
AEROSOL, METERED RESPIRATORY (INHALATION)
Qty: 8.5 G | Refills: 0 | Status: SHIPPED | OUTPATIENT
Start: 2021-03-08 | End: 2021-04-06

## 2021-03-08 NOTE — TELEPHONE ENCOUNTER
[facility]    No f/u     Last seen vv 1/5/21    Jae Sanabria MD 50 minutes ago (9:19 AM)        Can you approve this request as soon as possible?  Lolly Hoyt needs a new inhaler in this morning

## 2021-04-05 RX ORDER — ERGOCALCIFEROL 1.25 MG/1
CAPSULE ORAL
Qty: 6 CAPSULE | Refills: 0 | Status: SHIPPED | OUTPATIENT
Start: 2021-04-05 | End: 2021-07-24 | Stop reason: SDUPTHER

## 2021-04-06 RX ORDER — ALBUTEROL SULFATE 90 UG/1
AEROSOL, METERED RESPIRATORY (INHALATION)
Qty: 8.5 G | Refills: 0 | Status: SHIPPED | OUTPATIENT
Start: 2021-04-06 | End: 2021-04-30 | Stop reason: SDUPTHER

## 2021-04-07 PROBLEM — S05.00XA CORNEAL ABRASION: Status: ACTIVE | Noted: 2021-04-07

## 2021-04-09 ENCOUNTER — PATIENT MESSAGE (OUTPATIENT)
Dept: FAMILY MEDICINE CLINIC | Age: 21
End: 2021-04-09

## 2021-04-09 RX ORDER — HYDROXYZINE HYDROCHLORIDE 25 MG/1
TABLET, FILM COATED ORAL
Qty: 30 TABLET | Refills: 5 | Status: SHIPPED | OUTPATIENT
Start: 2021-04-09 | End: 2021-07-24 | Stop reason: SDUPTHER

## 2021-04-30 RX ORDER — ALBUTEROL SULFATE 90 UG/1
AEROSOL, METERED RESPIRATORY (INHALATION)
Qty: 8.5 G | Refills: 0 | Status: SHIPPED | OUTPATIENT
Start: 2021-04-30 | End: 2021-05-25

## 2021-05-15 ENCOUNTER — TELEPHONE (OUTPATIENT)
Dept: FAMILY MEDICINE CLINIC | Age: 21
End: 2021-05-15

## 2021-05-17 RX ORDER — BUDESONIDE AND FORMOTEROL FUMARATE DIHYDRATE 160; 4.5 UG/1; UG/1
AEROSOL RESPIRATORY (INHALATION)
Qty: 30.6 G | Refills: 0 | Status: SHIPPED | OUTPATIENT
Start: 2021-05-17 | End: 2021-05-17 | Stop reason: SDUPTHER

## 2021-05-17 RX ORDER — BUDESONIDE AND FORMOTEROL FUMARATE DIHYDRATE 160; 4.5 UG/1; UG/1
AEROSOL RESPIRATORY (INHALATION)
Qty: 30.6 G | Refills: 0 | Status: SHIPPED | OUTPATIENT
Start: 2021-05-17 | End: 2021-07-24 | Stop reason: SDUPTHER

## 2021-05-17 NOTE — TELEPHONE ENCOUNTER
SYMBICORT 160-4.5 MCG/ACT AERO 30.6 g 0 5/17/2021     Sig: INHALE TWO PUFFS BY MOUTH TWICE A DAY    Sent to pharmacy as: Symbicort 160-4.5 MCG/ACT Inhalation Aerosol (budesonide-formoterol)    E-Prescribing Status: Transmission to pharmacy failed (5/17/2021  9:27 AM EDT)

## 2021-05-25 RX ORDER — ALBUTEROL SULFATE 90 UG/1
AEROSOL, METERED RESPIRATORY (INHALATION)
Qty: 8.5 G | Refills: 0 | Status: SHIPPED | OUTPATIENT
Start: 2021-05-25 | End: 2021-06-24

## 2021-06-24 RX ORDER — ALBUTEROL SULFATE 90 UG/1
AEROSOL, METERED RESPIRATORY (INHALATION)
Qty: 8.5 G | Refills: 0 | Status: SHIPPED | OUTPATIENT
Start: 2021-06-24 | End: 2021-07-24 | Stop reason: SDUPTHER

## 2021-07-26 RX ORDER — ALBUTEROL SULFATE 90 UG/1
AEROSOL, METERED RESPIRATORY (INHALATION)
Qty: 8.5 G | Refills: 0 | Status: SHIPPED | OUTPATIENT
Start: 2021-07-26 | End: 2021-08-12

## 2021-07-26 RX ORDER — BUDESONIDE AND FORMOTEROL FUMARATE DIHYDRATE 160; 4.5 UG/1; UG/1
AEROSOL RESPIRATORY (INHALATION)
Qty: 30.6 G | Refills: 0 | Status: SHIPPED | OUTPATIENT
Start: 2021-07-26 | End: 2021-11-10

## 2021-07-26 RX ORDER — ERGOCALCIFEROL 1.25 MG/1
CAPSULE ORAL
Qty: 6 CAPSULE | Refills: 0 | Status: SHIPPED | OUTPATIENT
Start: 2021-07-26 | End: 2021-10-17

## 2021-07-26 RX ORDER — HYDROXYZINE HYDROCHLORIDE 25 MG/1
TABLET, FILM COATED ORAL
Qty: 30 TABLET | Refills: 5 | Status: SHIPPED | OUTPATIENT
Start: 2021-07-26

## 2021-08-10 ENCOUNTER — OFFICE VISIT (OUTPATIENT)
Dept: FAMILY MEDICINE CLINIC | Age: 21
End: 2021-08-10
Payer: MEDICARE

## 2021-08-10 VITALS
WEIGHT: 138 LBS | DIASTOLIC BLOOD PRESSURE: 74 MMHG | RESPIRATION RATE: 12 BRPM | SYSTOLIC BLOOD PRESSURE: 131 MMHG | HEART RATE: 97 BPM | TEMPERATURE: 97 F | HEIGHT: 62 IN | BODY MASS INDEX: 25.4 KG/M2

## 2021-08-10 DIAGNOSIS — J98.01 BRONCHOSPASM: ICD-10-CM

## 2021-08-10 DIAGNOSIS — Z00.01 ENCOUNTER FOR GENERAL ADULT MEDICAL EXAMINATION WITH ABNORMAL FINDINGS: Primary | ICD-10-CM

## 2021-08-10 DIAGNOSIS — Z72.0 TOBACCO USE: ICD-10-CM

## 2021-08-10 DIAGNOSIS — R25.1 TREMOR: ICD-10-CM

## 2021-08-10 DIAGNOSIS — E55.9 HYPOVITAMINOSIS D: ICD-10-CM

## 2021-08-10 DIAGNOSIS — F99 MENTAL HEALTH DISORDER: ICD-10-CM

## 2021-08-10 DIAGNOSIS — Z98.890 HISTORY OF TYMPANOPLASTY: ICD-10-CM

## 2021-08-10 PROCEDURE — 99395 PREV VISIT EST AGE 18-39: CPT | Performed by: INTERNAL MEDICINE

## 2021-08-10 ASSESSMENT — PATIENT HEALTH QUESTIONNAIRE - PHQ9
SUM OF ALL RESPONSES TO PHQ QUESTIONS 1-9: 0
2. FEELING DOWN, DEPRESSED OR HOPELESS: 0
SUM OF ALL RESPONSES TO PHQ QUESTIONS 1-9: 0
1. LITTLE INTEREST OR PLEASURE IN DOING THINGS: 0
SUM OF ALL RESPONSES TO PHQ QUESTIONS 1-9: 0
SUM OF ALL RESPONSES TO PHQ9 QUESTIONS 1 & 2: 0

## 2021-08-10 NOTE — PROGRESS NOTES
History and Physical      Yesi Mason  YOB: 2000    Date of Service:  8/10/2021    Chief Complaint:   Yesi Mason is a 19 y.o. male who presents for complete physical examination. Health issues include ankylosing spondylitis, transient Humira use,, low vitamin D, renal calculi, tobacco addiction, asthma, mental health issues with multiple drug sensitivities, restrictive lung disease secondary to ankylosing spondylitis, occasional ectopy, no congenital deafness. Concerns today include intermittent ear pain, dry skin,    Headache following with neurology. MRI with arachnoid granulation with questionable increased intracerebral pressure however patient declined LP other states after review film LP was not indicated. .Vision complaints improved with nonprescription lenses. Recent follow-up    ER visit 5/2021 with suicidal thought without plan. History of intermittent explosive disorder and bipolar disorder as well as posttraumatic stress syndrome. Is seeing a therapist.  No medication. Stays up at nighttime. Does go out before bedtime with family member or other caretaker. Atarax was helpful for sleep however he came sleepy when he took it early 1 night. Not currently using nightly. Has tried Benadryl and melatonin without effect. Is doing better   fit.     Following with rheumatology for ankylosing spondylitis. Transient Humira and discontinued secondary to side effects. Does have TMJ on the right. Occasional falls. No iritis, inflammatory bowel disease, he does have restrictive pulmonary function test.   Positive HLA-B27. Ankle discomfort no known trauma.      History low vitamin D.  50,000 units monthly. Asthma diagnosis. Likes albuterol. Symbicort not effective. No rhinorrhea not interested in [place]. 4 cigarettes daily.  Had hallucinations with Wellbutrin.      Hx renal calculi no surgical intervention needed.     Mental health issues.  Born in [facility] see he has Corneal abrasion       Preventive Care:  Health Maintenance   Topic Date Due    Hepatitis C screen  Never done    Varicella vaccine (2 of 2 - 2-dose childhood series) 03/21/2002    HIV screen  Never done    Annual Wellness Visit (AWV)  Never done    Flu vaccine (1) 09/01/2021    DTaP/Tdap/Td vaccine (8 - Td or Tdap) 08/07/2028    Pneumococcal 0-64 years Vaccine (2 of 2 - PPSV23) 03/21/2063    Hepatitis A vaccine  Completed    Hepatitis B vaccine  Completed    HPV vaccine  Completed    Meningococcal (ACWY) vaccine  Completed    COVID-19 Vaccine  Completed    Hib vaccine  Aged Out      Self-testicular exams: Declines  Sexual activity: No answer.   Last eye exam: thi month   Exercise: active   Seatbelt use: [employer]  Lipid panel:  No results found for: CHOL, TRIG, HDL, LDLCALC, LDLDIRECT        Immunization History   Administered Date(s) Administered    COVID-19, Pfizer, PF, 30mcg/0.3mL 04/15/2021, 05/06/2021    DTP 2000, 2000, 03/03/1999, 09/08/1999    DTaP 2000, 1998, 03/03/1999, 09/08/1999, 08/19/2010    DTaP vaccine 08/19/2010    HPV Quadrivalent (Gardasil) 05/15/2012, 09/28/2012, 09/04/2013    Hepatitis A Ped/Adol (Havrix, Vaqta) 05/10/2003, 12/18/2003    Hepatitis A Vaccine 05/10/2003, 12/18/2003    Hepatitis B 2000, 2000, 01/23/1999    Hepatitis B Ped/Adol (Engerix-B, Recombivax HB) 2000, 2000, 01/23/1999    MMR 05/23/1999, 05/31/2001    Meningococcal ACWY Vaccine 08/05/2009    Meningococcal MCV4P (Menactra) 12/17/2014    Pneumococcal Conjugate 13-valent ([id number]) 04/20/2015    Pneumococcal Polysaccharide ([id number]) 07/19/2016    Polio IPV (IPOL) 1998, 1998, 03/03/1999, 10/22/2003    Polio Virus Vaccine 1998, 1998, 03/03/1999, 10/22/2003    Tdap (Boostrix, Adacel) 10/23/2005, 08/07/2018    Varicella (Varivax) 05/31/2001       Allergies   Allergen Reactions    Latex Itching    Abilify [Aripiprazole] Other (See Comments)     Increased agitation    Divalproex Sodium Other (See Comments)     Double vision    Flovent Hfa [Fluticasone] Other (See Comments)     Throat burn      Invega [Paliperidone] Other (See Comments)     Nightmares      Risperidone And Related Other (See Comments)     Increased agitation    Seroquel [Quetiapine] Other (See Comments)     Makes him see things    Wellbutrin [Bupropion] Other (See Comments)     hallucinations    Zyprexa [Olanzapine] Other (See Comments)     Increased agitation     No outpatient medications have been marked as taking for the 8/10/21 encounter (Office Visit) with Dorys Montoya MD.       Past Medical History:   Diagnosis Date    Arthritis     Asthma     Bronchospasm 1/5/2021    Mental health disorder 1/20/2020    Vitamin D deficiency      Past Surgical History:   Procedure Laterality Date    ADENOIDECTOMY      CIRCUMCISION      TYMPANOSTOMY TUBE PLACEMENT      colesteoma     No family history on file. Physical Exam:   Vitals:    08/10/21 1501   BP: 131/74   Pulse: 97   Resp: 12   Temp: 97 °F (36.1 °C)   Weight: 138 lb (62.6 kg)   Height: 5' 2\" (1.575 m)     Body mass index is 24.33 kg/m². Constitutional: He is oriented to person, place, and time. He appears well-developed and well-nourished. No distress. Initially working on his iPad and wanting mother to answer questions but later is interactive and appropriate. HEENT: Green hair dye. Abnormal right tympanic membrane. Left is clear. Good dentition. Throat is clear. No thyroid nodules. Lungs are clear good TIGRE ratio without any wheezes rales or rhonchi. Cardiovascular exam regular rate and rhythm without any murmur click. Abdomen is benign no hepatosplenomegaly epigastric mass or tenderness. Good range of motion of the right ankle. No synovial swelling some tenderness. No erythema. No suspicious skin lesions or nodules positive tattoos  Assessment/Plan:   Diagnosis Orders   1.  Encounter for general adult medical examination with abnormal findings     2. History of tympanoplasty     3. Hypovitaminosis D     4. Mental health disorder     5. Tobacco use     6. Tremor     7. Bronchospasm       Tympanoplasty with decreased hearing    Low vitamin D. Will recheck with his rheumatologist.    Mental health disorder doing well with his specialist.    Tobacco use with history of bronchospasm discussed cessation.     Intermittent headaches tremor following with neurology

## 2021-08-12 RX ORDER — ALBUTEROL SULFATE 90 UG/1
AEROSOL, METERED RESPIRATORY (INHALATION)
Qty: 8.5 G | Refills: 0 | Status: SHIPPED | OUTPATIENT
Start: 2021-08-12 | End: 2021-08-30

## 2021-08-30 RX ORDER — ALBUTEROL SULFATE 90 UG/1
AEROSOL, METERED RESPIRATORY (INHALATION)
Qty: 8.5 G | Refills: 0 | Status: SHIPPED | OUTPATIENT
Start: 2021-08-30 | End: 2021-10-08

## 2021-10-08 RX ORDER — ALBUTEROL SULFATE 90 UG/1
AEROSOL, METERED RESPIRATORY (INHALATION)
Qty: 8.5 G | Refills: 0 | Status: SHIPPED | OUTPATIENT
Start: 2021-10-08

## 2021-10-17 RX ORDER — ERGOCALCIFEROL 1.25 MG/1
CAPSULE ORAL
Qty: 6 CAPSULE | Refills: 0 | Status: SHIPPED | OUTPATIENT
Start: 2021-10-17 | End: 2022-04-08

## 2021-11-10 RX ORDER — BUDESONIDE AND FORMOTEROL FUMARATE DIHYDRATE 160; 4.5 UG/1; UG/1
AEROSOL RESPIRATORY (INHALATION)
Qty: 10.2 G | Refills: 3 | Status: SHIPPED | OUTPATIENT
Start: 2021-11-10

## 2021-11-21 PROBLEM — H91.90 HEARING LOSS: Status: ACTIVE | Noted: 2021-11-21

## 2022-04-08 RX ORDER — ERGOCALCIFEROL 1.25 MG/1
CAPSULE ORAL
Qty: 6 CAPSULE | Refills: 0 | Status: SHIPPED | OUTPATIENT
Start: 2022-04-08